# Patient Record
Sex: MALE | Race: WHITE | NOT HISPANIC OR LATINO | URBAN - METROPOLITAN AREA
[De-identification: names, ages, dates, MRNs, and addresses within clinical notes are randomized per-mention and may not be internally consistent; named-entity substitution may affect disease eponyms.]

---
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
## 2020-12-06 ENCOUNTER — EMERGENCY (EMERGENCY)
Facility: HOSPITAL | Age: 20
LOS: 0 days | Discharge: HOME | End: 2020-12-06
Attending: EMERGENCY MEDICINE | Admitting: EMERGENCY MEDICINE
Payer: COMMERCIAL

## 2020-12-06 VITALS
DIASTOLIC BLOOD PRESSURE: 78 MMHG | WEIGHT: 184.97 LBS | HEIGHT: 72 IN | RESPIRATION RATE: 20 BRPM | HEART RATE: 71 BPM | TEMPERATURE: 98 F | OXYGEN SATURATION: 98 % | SYSTOLIC BLOOD PRESSURE: 132 MMHG
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
## 2020-12-06 DIAGNOSIS — M25.561 PAIN IN RIGHT KNEE: ICD-10-CM

## 2020-12-06 DIAGNOSIS — S83.519A SPRAIN OF ANTERIOR CRUCIATE LIGAMENT OF UNSPECIFIED KNEE, INITIAL ENCOUNTER: Chronic | ICD-10-CM
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
## 2020-12-06 DIAGNOSIS — S89.91XA UNSPECIFIED INJURY OF RIGHT LOWER LEG, INITIAL ENCOUNTER: ICD-10-CM

## 2020-12-06 PROCEDURE — 73564 X-RAY EXAM KNEE 4 OR MORE: CPT | Mod: 26,RT

## 2020-12-06 PROCEDURE — 73590 X-RAY EXAM OF LOWER LEG: CPT | Mod: 26,RT

## 2020-12-06 PROCEDURE — 99284 EMERGENCY DEPT VISIT MOD MDM: CPT

## 2020-12-06 RX ORDER — IBUPROFEN 200 MG
0 TABLET ORAL
Qty: 0 | Refills: 0 | DISCHARGE

## 2020-12-06 NOTE — ED PROVIDER NOTE - CLINICAL SUMMARY MEDICAL DECISION MAKING FREE TEXT BOX
x ray reviewed, results d/w patient, Rec JENNIFER, knee immobilizer.  Pt will f/u with his Orthopedic. Pt instructed to return if any worsening symptoms or concerns.  They verbalize understanding.
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
## 2020-12-06 NOTE — ED PROVIDER NOTE - PHYSICAL EXAMINATION
CONST: Well appearing in NAD  EYES: PERRL, EOMI, Sclera and conjunctiva clear.   ENT: No nasal discharge. Oropharynx normal appearing, no erythema or exudates. No abscess or swelling. Uvula midline.   NECK: Non-tender, no meningeal signs. normal ROM. supple   CARD: S1 S2; No jvd  RESP: Equal BS B/L, No wheezes, rhonchi or rales. No distress  GI: Soft, non-tender, non-distended. no cva tenderness. normal BS  MS: Tenderness to posterior R knee. Normal ROM in all extremities. pulses 2 +. no calf tenderness or swelling  SKIN: Warm, dry, no acute rashes. Good turgor  NEURO: A&Ox3, No focal deficits. Strength 5/5 with no sensory deficits. Steady gait.
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
## 2020-12-06 NOTE — ED PROVIDER NOTE - CARE PROVIDER_API CALL
Michele Alaniz  [facility]  [address] [place] [id number]  Phone: [phone]  Fax: [phone]  Follow Up Time:

## 2020-12-06 NOTE — ED PROVIDER NOTE - NSFOLLOWUPINSTRUCTIONS_ED_ALL_ED_FT
Follow up with PMD and Orthopedics in 1-2 days.    RICE for Routine Care of Injuries  The routine care of many injuries includes rest, ice, compression, and elevation (RICE therapy). RICE therapy is often recommended for injuries to soft tissues, such as a muscle strain, ligament injuries, bruises, and overuse injuries. It can also be used for some bony injuries. Using RICE therapy can help to relieve pain, lessen swelling, and enable your body to heal.    Rest  Rest is required to allow your body to heal. This usually involves reducing your normal activities and avoiding use of the injured part of your body. Generally, you can return to your normal activities when you are comfortable and have been given permission by your health care provider.    Ice  Image   Icing your injury helps to keep the swelling down, and it lessens pain. Do not apply ice directly to your skin.    Put ice in a plastic bag.  Place a towel between your skin and the bag.  Leave the ice on for 20 minutes, 2–3 times a day.    Do this for as long as you are directed by your health care provider.    Compression  Compression means putting pressure on the injured area. Compression helps to keep swelling down, gives support, and helps with discomfort. Compression may be done with an elastic bandage. If an elastic bandage has been applied, follow these general tips:    Remove and reapply the bandage every 3–4 hours or as directed by your health care provider.  Make sure the bandage is not wrapped too tightly, because this can cut off circulation. If part of your body beyond the bandage becomes blue, numb, cold, swollen, or more painful, your bandage is most likely too tight. If this occurs, remove your bandage and reapply it more loosely.  See your health care provider if the bandage seems to be making your problems worse rather than better.    Elevation  Elevation means keeping the injured area raised. This helps to lessen swelling and decrease pain. If possible, your injured area should be elevated at or above the level of your heart or the center of your chest.    When should I seek medical care?  If your pain and swelling continue.  If your symptoms are getting worse rather than improving.  These symptoms may indicate that further evaluation or further X-rays are needed. Sometimes, X-rays may not show a small broken bone (fracture) until a number of days later. Make a follow-up appointment with your health care provider.    When should I seek immediate medical care?  If you have sudden severe pain at or below the area of your injury.  If you have redness or increased swelling around your injury.  If you have tingling or numbness at or below the area of your injury that does not improve after you
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
## 2020-12-06 NOTE — ED PROVIDER NOTE - PATIENT PORTAL LINK FT
You can access the FollowMyHealth Patient Portal offered by [facility] by registering at the following website: [email or web address]. By joining [email or web address] FollowMyHealth portal, you will also be able to view your health information using other applications (apps) compatible with our system.

## 2020-12-07 NOTE — ED PROCEDURE NOTE - CPROC ED TIME OUT STATEMENT1
Form has been refaxed to Carrie Fairchild fax#[phone] also faxed to Nikhil (lucy) fax#[phone]   Spoke with dad informed him of above information no further questions   Closing encounter  Simona Collier RT (R)       “Patient's name, , procedure and correct site were confirmed during the [place] Timeout.”

## 2021-05-05 PROBLEM — Z00.00 ENCOUNTER FOR PREVENTIVE HEALTH EXAMINATION: Status: ACTIVE | Noted: 2021-05-05

## 2021-05-11 ENCOUNTER — RESULT CHARGE (OUTPATIENT)
Age: 21
End: 2021-05-11

## 2021-05-11 ENCOUNTER — APPOINTMENT (OUTPATIENT)
Dept: CARDIOLOGY | Facility: CLINIC | Age: 21
End: 2021-05-11
Payer: MEDICAID

## 2021-05-11 VITALS
DIASTOLIC BLOOD PRESSURE: 80 MMHG | HEART RATE: 69 BPM | SYSTOLIC BLOOD PRESSURE: 120 MMHG | HEIGHT: 74 IN | WEIGHT: 186 LBS | BODY MASS INDEX: 23.87 KG/M2 | TEMPERATURE: 97.2 F

## 2021-05-11 PROCEDURE — 99204 OFFICE O/P NEW MOD 45 MIN: CPT

## 2021-05-11 PROCEDURE — 99072 ADDL SUPL MATRL&STAF TM PHE: CPT

## 2021-05-11 PROCEDURE — 93000 ELECTROCARDIOGRAM COMPLETE: CPT

## 2021-05-11 RX ORDER — MINOCYCLINE HYDROCHLORIDE 50 MG/1
50 CAPSULE ORAL
Qty: 30 | Refills: 0 | Status: DISCONTINUED | COMMUNITY
Start: 2021-04-19

## 2021-05-11 RX ORDER — MINOCYCLINE HYDROCHLORIDE 100 MG/1
100 CAPSULE ORAL
Qty: 30 | Refills: 0 | Status: DISCONTINUED | COMMUNITY
Start: 2021-03-17

## 2021-05-28 ENCOUNTER — APPOINTMENT (OUTPATIENT)
Dept: CARDIOLOGY | Facility: CLINIC | Age: 21
End: 2021-05-28
Payer: MEDICAID

## 2021-05-28 PROCEDURE — 93306 TTE W/DOPPLER COMPLETE: CPT

## 2021-06-23 ENCOUNTER — EMERGENCY (EMERGENCY)
Facility: HOSPITAL | Age: 21
LOS: 0 days | Discharge: HOME | End: 2021-06-23
Attending: EMERGENCY MEDICINE | Admitting: EMERGENCY MEDICINE
Payer: MEDICAID

## 2021-06-23 VITALS — WEIGHT: 180.34 LBS

## 2021-06-23 VITALS
HEART RATE: 62 BPM | OXYGEN SATURATION: 99 % | TEMPERATURE: 97 F | DIASTOLIC BLOOD PRESSURE: 58 MMHG | SYSTOLIC BLOOD PRESSURE: 134 MMHG | RESPIRATION RATE: 20 BRPM

## 2021-06-23 DIAGNOSIS — S83.519A SPRAIN OF ANTERIOR CRUCIATE LIGAMENT OF UNSPECIFIED KNEE, INITIAL ENCOUNTER: Chronic | ICD-10-CM
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
## 2021-06-23 DIAGNOSIS — S83.511A SPRAIN OF ANTERIOR CRUCIATE LIGAMENT OF RIGHT KNEE, INITIAL ENCOUNTER: ICD-10-CM

## 2021-06-23 DIAGNOSIS — I47.1 SUPRAVENTRICULAR TACHYCARDIA: ICD-10-CM

## 2021-06-23 DIAGNOSIS — R07.9 CHEST PAIN, UNSPECIFIED: ICD-10-CM
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
## 2021-06-23 PROCEDURE — 99284 EMERGENCY DEPT VISIT MOD MDM: CPT

## 2021-06-23 PROCEDURE — 99291 CRITICAL CARE FIRST HOUR: CPT

## 2021-06-23 PROCEDURE — 93010 ELECTROCARDIOGRAM REPORT: CPT

## 2021-06-23 RX ORDER — MORPHINE SULFATE 50 MG/1
4 CAPSULE, EXTENDED RELEASE ORAL ONCE
Refills: 0 | Status: DISCONTINUED | OUTPATIENT
Start: 2021-06-23 | End: 2021-06-23

## 2021-06-23 NOTE — ED PROVIDER NOTE - CLINICAL SUMMARY MEDICAL DECISION MAKING FREE TEXT BOX
19 y/o M no PMH sent in by cardiologist due to tachycardia x4-5 days. Pt initially had an echo performed that was done to clear him for sports, and was found to have an arrhythmia. Subsequently he was asked to wear a halter monitor for 4 weeks and was found to be tachycardic over the past 4-5 days which is why he presents to the ED. Pt has had no significant changes in the past 4-5 days with no change in activity, SOB, HA, nausea, vomiting or diarrhea. Pt does note CP 3 days ago, 10 seconds, sharp but did self-resolve.  Pt notes no palpitations and states he feels completely normal. Vaccinations are UTD not including Covid-19. On exam:  Gen - NAD, Head - NCAT, TMs - clear b/l, Pharynx - clear, MMM, Heart - RRR, no m/g/r, Chest with no TTP. Lungs - CTAB, no w/c/r, Abdomen - soft, NT, ND, Skin - No rash, Ext- FROM, no edema, erythema, ecchymosis, Neuro - CN 2-12 intact, nl strength and sensation, nl gait. Plan: Initially consulted Dr. Leary who is currently on maternity leave. A nurse at her office notes that Dr. Allison called for the Pt to come to the ED but will most likely not be the Pt’s cardiologist. Dr. Milian is most likely to take the role for Dr. Leary during her leave. Consulted Dr. Allison who advised to connect with our in house cardiologist for EKG and monitoring of halter. 19 y/o M no PMH sent in by cardiologist due to tachycardia x4-5 days. Pt initially had an echo performed that was done to clear him for sports, and was found to have an arrhythmia. Subsequently he was asked to wear a halter monitor for 4 weeks and was found to be tachycardic over the past 4-5 days which is why he presents to the ED. Pt has had no significant changes in the past 4-5 days with no change in activity, SOB, HA, nausea, vomiting or diarrhea. Pt does note CP 3 days ago, 10 seconds, sharp but did self-resolve.  Pt notes no palpitations and states he feels completely normal. Vaccinations are UTD not including Covid-19. On exam:  Gen - NAD, Head - NCAT, TMs - clear b/l, Pharynx - clear, MMM, Heart - RRR, no m/g/r, Chest with no TTP. Lungs - CTAB, no w/c/r, Abdomen - soft, NT, ND, Skin -  No rash, Ext- FROM, no edema, erythema, ecchymosis, Neuro - CN 2-12 intact, nl strength and sensation, nl gait. Plan: Initially consulted Dr. Leary who is currently on maternity leave. A nurse at her office notes that Dr. Allison called for the Pt to come to the ED but will most likely not be the Pt’s cardiologist. Dr. Milian is most likely to take the role for Dr. Leary during her leave. Consulted Dr. Allison who advised to connect with our in house cardiologist for EKG and monitoring of halter. Cardio cleared patient for d/c home, EP cleared Holter - no need for medication. Pt to f/u with Dr. Joyce of EP outpatient and his cardiologist outpatient.

## 2021-06-23 NOTE — ED PROVIDER NOTE - PATIENT PORTAL LINK FT
You can access the FollowMyHealth Patient Portal offered by  by registering at the following website: [email or web address]. By joining [email or web address]’s FollowMyHealth portal, you will also be able to view your health information using other applications (apps) compatible with our system.

## 2021-06-23 NOTE — ED PROVIDER NOTE - CARE PLAN
Principal Discharge DX:	Sinus tachycardia  Secondary Diagnosis:	ACL (anterior cruciate ligament) tear

## 2021-06-23 NOTE — ED PEDIATRIC NURSE NOTE - NSSUHOSCREENINGYN_ED_ALL_ED
Yes - the patient is able to be screened
No. LAMBERTO screening performed.  STOP BANG Legend: 0-2 = LOW Risk; 3-4 = INTERMEDIATE Risk; 5-8 = HIGH Risk

## 2021-06-23 NOTE — ED PROVIDER NOTE - CARE PROVIDER_API CALL
Evelyn Joyce)  Cardiovascular Disease; Internal Medicine  [facility], [address]  [place], [place] [id number]  Phone: [phone]  Fax: [phone]  Follow Up Time: 7-10 Days

## 2021-06-23 NOTE — ED PEDIATRIC NURSE NOTE - CAS DISCH TRANSFER METHOD
Assessment and Plan:   I have discussed with the patient that a sample of skin via a "skin biopsy would be potentially helpful to further make a specific diagnosis under the microscope   Based on a thorough discussion of this condition and the management approach to it (including a comprehensive discussion of the known risks, side effects and potential benefits of treatment), the patient (family) agrees to implement the following specific plan:    o Procedure:  Skin Biopsy  After a thorough discussion of treatment options and risk/benefits/alternatives (including but not limited to local pain, scarring, dyspigmentation, blistering, possible superinfection, and inability to confirm a diagnosis via histopathology), verbal and written consent were obtained and portion of the rash was biopsied for tissue sample  See below for consent that was obtained from patient and subsequent Procedure Note  INFORMED CONSENT DISCUSSION AND POST-OPERATIVE INSTRUCTIONS FOR PATIENT    I   RATIONALE FOR PROCEDURE  I understand that a skin biopsy allows the Dermatologist to test a lesion or rash under the microscope to obtain a diagnosis  It usually involves numbing the area with numbing medication and removing a small piece of skin; sometimes the area will be closed with sutures  In this specific procedure, sutures are not usually needed  If any sutures are placed, then they are usually need to be removed in 2 weeks or less  I understand that my Dermatologist recommends that a skin "shave" biopsy be performed today  A local anesthetic, similar to the kind that a dentist uses when filling a cavity, will be injected with a very small needle into the skin area to be sampled  The injected skin and tissue underneath "will go to sleep and become numb so no pain should be felt afterwards    An instrument shaped like a tiny "razor blade" (shave biopsy instrument) will be used to cut a small piece of tissue and skin from the area so that a sample of tissue can be taken and examined more closely under the microscope  A slight amount of bleeding will occur, but it will be stopped with direct pressure and a pressure bandage and any other appropriate methods  I understands that a scar will form where the wound was created  Surgical ointment will be applied to help protect the wound  Sutures are not usually needed  II   RISKS AND POTENTIAL COMPLICATIONS   I understand the risks and potential complications of a skin biopsy include but are not limited to the following:   Bleeding   Infection   Pain   Scar/keloid   Skin discoloration   Incomplete Removal   Recurrence   Nerve Damage/Numbness/Loss of Function   Allergic Reaction to Anesthesia   Biopsies are diagnostic procedures and based on findings additional treatment or evaluation may be required   Loss or destruction of specimen resulting in no additional findings    My Dermatologist has explained to me the nature of the condition, the nature of the procedure, and the benefits to be reasonably expected compared with alternative approaches  My Dermatologist has discussed the likelihood of major risks or complications of this procedure including the specific risks listed above, such as bleeding, infection, and scarring/keloid  I understand that a scar is expected after this procedure  I understand that my physician cannot predict if the scar will form a "keloid," which extends beyond the borders of the wound that is created  A keloid is a thick, painful, and bumpy scar  A keloid can be difficult to treat, as it does not always respond well to therapy, which includes injecting cortisone directly into the keloid every few weeks  While this usually reduces the pain and size of the scar, it does not eliminate it  I understand that photographs may be taken before and after the procedure    These will be maintained as part of the medical providers confidential records and may not be made available to me  I further authorize the medical provider to use the photographs for teaching purposes or to illustrate scientific papers, books, or lectures if in his/her judgment, medical research, education, or science may benefit from its use  I have had an opportunity to fully inquire about the risks and benefits of this procedure and its alternatives  I have been given ample time and opportunity to ask questions and to seek a second opinion if I wished to do so  I acknowledge that there have specifically been no guarantees as to the cosmetic results from the procedure  I am aware that with any procedure there is always the possibility of an unexpected complication  III  POST-PROCEDURAL CARE (WHAT YOU WILL NEED TO DO "AFTER THE BIOPSY" TO OPTIMIZE HEALING)     Keep the area clean and dry  Try NOT to remove the bandage or get it wet for the first 24 hours   Gently clean the area and apply surgical ointment (such as Vaseline petrolatum ointment, which is available "over the counter" and not a prescription) to the biopsy site for up to 2 weeks straight  This acts to protect the wound from the outside world   Sutures are not usually placed in this procedure  If any sutures were placed, return for suture removal as instructed (generally 1 week for the face, 2 weeks for the body)   Take Acetaminophen (Tylenol) for discomfort, if no contraindications  Ibuprofen or aspirin could make bleeding worse   Call our office immediately for signs of infection: fever, chills, increased redness, warmth, tenderness, discomfort/pain, or pus or foul smell coming from the wound  WHAT TO DO IF THERE IS ANY BLEEDING? If a small amount of bleeding is noticed, place a clean cloth over the area and apply firm pressure for ten minutes  Check the wound after 10 minutes of direct pressure  If bleeding persists, try one more time for an additional 10 minutes of direct pressure on the area  If the bleeding becomes heavier or does not stop after the second attempt, or if you have any other questions about this procedure, then please call your [facility] by calling [phone] (SKIN)  I hereby acknowledge that I have reviewed and verified the site with my Dermatologist and have requested and authorized my Dermatologist to proceed with the procedure  Assessment and Plan:  Based on a thorough discussion of this condition and the management approach to it (including a comprehensive discussion of the known risks, side effects and potential benefits of treatment), the patient (family) agrees to implement the following specific plan:   Apply Triamcinolone ointment  1 to 3 time a day for two weeks avoid eye area  Discussed risk of thinning of skin if used for a long period of time   Probably related to product that you are applying to face     Follow up in 3 months Private car

## 2021-06-23 NOTE — ED PROVIDER NOTE - PHYSICAL EXAMINATION
GENERAL: well-appearing, well nourished, no acute distress, AOx3  HEENT: Nonerthematous pharynx, no tonsillar hypertrophy or exudates.   CVS: RRR, S1, S2, no murmurs, cap refill < 2 seconds  RESP: lungs clear to auscultation B/L, no wheezing, ronchi, or crackles. Good air entry.   ABD: +BS, soft, nontender, nondistended  SKIN: good turgor, no rash, no bruising,

## 2021-06-23 NOTE — ED PEDIATRIC NURSE NOTE - OBJECTIVE STATEMENT
20 year old male sent in by cardiologist because heart monitor read his HR to be in the 160s for the past few days. Pt denies pain and palpitations. Pt states he did not feel his heart beating fast. Pt states he was playing soccer while wearing the monitoring.

## 2021-06-23 NOTE — ED PROVIDER NOTE - NS ED ROS FT
REVIEW OF SYSTEMS:  CONSTITUTIONAL: (-) fever (-) weakness (-) diaphoresis (-) pain  EYES: (-) change in vision (-) photophobia (-) eye pain  ENT: (-) sore throat (-) ear pain  (-) nasal discharge (-) congestion  NECK: (-) pain, (-) stiffness  CARDIOVASCULAR: (+) chest pain (-) palpitations  RESPIRATORY: (-) SOB (-) cough  (-) wheeze (-) WOB  GASTROINTESTINAL: (-) abdominal pain (-) nausea (-) vomiting (-) diarrhea (-) constipation  GENITOURINARY: (-) dysuria (-) hematuria (-) increased frequency (-) increased urgency  Neurological:  (-) focal deficit (-) altered mental status (-) dizziness (-) headache (-) seizure  SKIN: (-) rash (-) itching (-) joint pain (-) MSK pain (-) swelling  GENERAL: (-) recent travel (-) sick contacts (-) decreased PO (-) decreased urine output

## 2021-06-23 NOTE — CONSULT NOTE ADULT - SUBJECTIVE AND OBJECTIVE BOX
Patient is a 20y old  Male who presents to the ER after he was found to have  to 180 on MCOT    HPI:  hx goes back to 1 month prior to presentation when he was sent for TTE as part of screening for HCOM before start playing soccer  his PCP found an irregular heart beat on TTE exam and he referred him to cardiology for further investigation   he went to dr austin office and he underwent TTE which was negative for any structural heart disease. His EKG showed NSR at that time. MCOT was placed for 30 days on 5/28/21  he was also referred for blood test but he did not do it   he is in the ER to check on his MCOT after he was found to have  to 160   otherwise he denies any palpitations, no chest pain, no dizziness or lightheadedness on exercise, no sob, no LOC, no family hx of SCD, no family hx of ischemic heart disease at early age     PAST MEDICAL & SURGICAL HISTORY:  No pertinent past medical history    ACL (anterior cruciate ligament) tear         ECHO  FINDINGS:  Normal global left ventricular systolic function.   Normal left ventricular internal cavity size.    Normal in size left atrium.  Normal right atrium.   Mild tricuspid regurgitation.   Mild pulmonic valve regurgitation.   Trace mitral valve regurgitation.   There is no evidence of pericardial effusion        MEDICATIONS  (STANDING):    MEDICATIONS  (PRN):      FAMILY HISTORY:  no SCD   no family hx of Congenital heart disease  no ischemic heart disease at early age       REVIEW OF SYSTEMS    General: NAD, healthy active patient     Respiratory and Thorax: no sob, no orthopnea   	  Cardiovascular:	no chest pain, no palpitations    Gastrointestinal:	no abdominal pain     Genitourinary:	no flank pain     Allergic/Immunologic:	  SOCIAL HISTORY: denies energy drinks, no pre work out, no illicit drug use, drinks coffee every day     CIGARETTES: non smoker    ALCOHOL: non alcohol consumer    Vital Signs Last 24 Hrs  T(C): 36.2 (23 Jun 2021 12:12), Max: 36.2 (23 Jun 2021 12:12)  T(F): 97.1 (23 Jun 2021 12:12), Max: 97.1 (23 Jun 2021 12:12)  HR: 62 (23 Jun 2021 12:12) (62 - 62)  BP: 134/58 (23 Jun 2021 12:12) (134/58 - 134/58)  BP(mean): --  RR: 20 (23 Jun 2021 12:12) (20 - 20)  SpO2: 99% (23 Jun 2021 12:12) (99% - 99%)    PHYSICAL EXAM:      Constitutional: in NAD,      Neck: no JVD     Respiratory: clear breath sounds bilaterally     Cardiovascular: normal s1s2, no murmurs     Gastrointestinal: soft, non tender, non distended     Genitourinary: no flank pain     Extremities: no RUTH     Vascular: bilateral pedal and radial pulses     Skin: no cyanosis           ECG: NSR + early repolarization     I&O's Detail      LABS:                  I&O's Summary    BNP  RADIOLOGY & ADDITIONAL STUDIES:

## 2021-06-23 NOTE — ED PEDIATRIC TRIAGE NOTE - HEART RATE METHOD
Spoke with mom, offered appointment for June 27. Mom is worried she will still be inpatient. Mom will call back once discharged to schedule.    noninvasive blood pressure monitor

## 2021-06-23 NOTE — CONSULT NOTE ADULT - ASSESSMENT
Patient is a 20y old  Male who presents to the ER after he was found to have  to 180 on his MCOT, after the monitor detect SVT. Dr. walsh referred him to the ER for further investigations. denies any palpitations, no chest pain, no syncope, no sob, no dizziness or lightheadedness     impression:  - MCOT detection of abnormal HR, most likely SVT     Plan :  - MCOT interrogation by EP  - EKG showed NSR and early repolarization   - TTE showed normal EF and no structural abnormalities   - check TSH, CBC, UDS   - treadmill stress test to detect any underlying ischemic heart disease     Attending note will follow  Patient is a 20y old  Male who presents to the ER after he was found to have  to 180 on his MCOT, after the monitor detect SVT. Dr. walsh referred him to the ER for further investigations. denies any palpitations, no chest pain, no syncope, no sob, no dizziness or lightheadedness     impression:  - MCOT detection of SVT     Plan :  - MCOT interrogation by EP  - EKG showed NSR and early repolarization in the ER  - TTE showed normal EF and no structural abnormalities   - check TSH, CBC, UDS   - F/U with EP to discuss with the patient possibility of ablation vs medical therapy   - treadmill stress test to detect any arrhythmias during exercise as OP     Attending note will follow

## 2021-06-23 NOTE — ED PROVIDER NOTE - OBJECTIVE STATEMENT
21yo M with no pmhx sent by Cardiologist due to tachyardia ranging from 140-180s over the past 4-5 days. On Holter monitor until 06/28. Echo done a few weeks ago showed concern for arrythmia and patient has been wearing Holter since then. Denies any N/V, blurry vision, headache, dizziness, LOC, weakness. Plays soccer and works out. Next appt with Cardiologist in Mid-July.  No pmhx, surgery on R knee in 2018 for torn ligaments, Vax UTD. 19yo M with no pmhx sent by Cardiologist due to tachyardia ranging from 140-180s over the past 4-5 days. On Holter monitor until 06/28. Echo done a few weeks ago showed concern for arrythmia and patient has been wearing Holter since then. Endorses chest pain 3 days ago lasting 10 seconds and self-resolving. Denies any N/V, blurry vision, headache, dizziness, LOC, weakness. Plays soccer and works out. Next appt with Cardiologist in Mid-July.  No pmhx, surgery on R knee in 2018 for torn ligaments, Vax UTD.

## 2021-06-27 ENCOUNTER — APPOINTMENT (OUTPATIENT)
Dept: CARDIOLOGY | Facility: CLINIC | Age: 21
End: 2021-06-27
Payer: MEDICAID

## 2021-06-27 PROCEDURE — 93228 REMOTE 30 DAY ECG REV/REPORT: CPT

## 2021-07-14 ENCOUNTER — LABORATORY RESULT (OUTPATIENT)
Age: 21
End: 2021-07-14

## 2021-07-14 ENCOUNTER — APPOINTMENT (OUTPATIENT)
Dept: CARDIOLOGY | Facility: CLINIC | Age: 21
End: 2021-07-14
Payer: MEDICAID

## 2021-07-14 VITALS
HEIGHT: 74 IN | WEIGHT: 186 LBS | SYSTOLIC BLOOD PRESSURE: 119 MMHG | HEART RATE: 59 BPM | DIASTOLIC BLOOD PRESSURE: 68 MMHG | BODY MASS INDEX: 23.87 KG/M2

## 2021-07-14 DIAGNOSIS — Z82.49 FAMILY HISTORY OF ISCHEMIC HEART DISEASE AND OTHER DISEASES OF THE CIRCULATORY SYSTEM: ICD-10-CM

## 2021-07-14 DIAGNOSIS — Z78.9 OTHER SPECIFIED HEALTH STATUS: ICD-10-CM

## 2021-07-14 LAB
ALBUMIN SERPL ELPH-MCNC: 4.9 G/DL
ALP BLD-CCNC: 131 U/L
ALT SERPL-CCNC: 690 U/L
ANION GAP SERPL CALC-SCNC: 14 MMOL/L
AST SERPL-CCNC: 265 U/L
BASOPHILS # BLD AUTO: 0.03 K/UL
BASOPHILS NFR BLD AUTO: 0.6 %
BILIRUB SERPL-MCNC: 0.8 MG/DL
BUN SERPL-MCNC: 13 MG/DL
CALCIUM SERPL-MCNC: 10 MG/DL
CHLORIDE SERPL-SCNC: 99 MMOL/L
CO2 SERPL-SCNC: 27 MMOL/L
CREAT SERPL-MCNC: 1.1 MG/DL
EOSINOPHIL # BLD AUTO: 0.42 K/UL
EOSINOPHIL NFR BLD AUTO: 7.9 %
GLUCOSE SERPL-MCNC: 80 MG/DL
HCT VFR BLD CALC: 48.2 %
HGB BLD-MCNC: 16.6 G/DL
IMM GRANULOCYTES NFR BLD AUTO: 0.4 %
LYMPHOCYTES # BLD AUTO: 1.63 K/UL
LYMPHOCYTES NFR BLD AUTO: 30.5 %
MAN DIFF?: NORMAL
MCHC RBC-ENTMCNC: 30.5 PG
MCHC RBC-ENTMCNC: 34.4 G/DL
MCV RBC AUTO: 88.6 FL
MONOCYTES # BLD AUTO: 0.82 K/UL
MONOCYTES NFR BLD AUTO: 15.3 %
NEUTROPHILS # BLD AUTO: 2.43 K/UL
NEUTROPHILS NFR BLD AUTO: 45.3 %
PLATELET # BLD AUTO: 235 K/UL
POTASSIUM SERPL-SCNC: 4.5 MMOL/L
PROT SERPL-MCNC: 7.9 G/DL
RBC # BLD: 5.44 M/UL
RBC # FLD: 12.3 %
SODIUM SERPL-SCNC: 140 MMOL/L
T4 FREE SERPL-MCNC: 1.6 NG/DL
TSH SERPL-ACNC: 1.62 UIU/ML
WBC # FLD AUTO: 5.35 K/UL

## 2021-07-14 PROCEDURE — 99205 OFFICE O/P NEW HI 60 MIN: CPT

## 2021-07-14 PROCEDURE — 93000 ELECTROCARDIOGRAM COMPLETE: CPT

## 2021-07-14 NOTE — PHYSICAL EXAM
[Well Developed] : well developed [Well Nourished] : well nourished [No Acute Distress] : no acute distress [Normal Conjunctiva] : normal conjunctiva [Normal Venous Pressure] : normal venous pressure [Normal S1, S2] : normal S1, S2 [No Murmur] : no murmur [No Rub] : no rub [Clear Lung Fields] : clear lung fields [Good Air Entry] : good air entry [No Respiratory Distress] : no respiratory distress  [Soft] : abdomen soft [Normal Gait] : normal gait [No Edema] : no edema [No Cyanosis] : no cyanosis [No Clubbing] : no clubbing [No Varicosities] : no varicosities [No Rash] : no rash [Moves all extremities] : moves all extremities [No Focal Deficits] : no focal deficits [Normal Speech] : normal speech [Alert and Oriented] : alert and oriented [Normal memory] : normal memory

## 2021-07-14 NOTE — ASSESSMENT
[FreeTextEntry1] : # Sinus tachycardia\par - MCOT reviewed in detail. No evidence of any arrhythmias. Pt had sinus tachycardia while playing soccer. Avg HR was in the 70s at baseline. \par - Pt with no evidenc eof structural heart disease on 2D echo\par - Rec exercise treadmill stress to eval for exercise induced arrhythmias\par - Check baseline labs including CBC, TSH, CMP\par - Decrease caffeine intake (limit to 1 cup if possible)\par \par # Vasovagal reflex\par - We discussed the implications of this process and I educated him on the importance of hydration. We also talked about increasing salt consumption via chips/nuts. \par - We also discussed maneuvers he can perform whenever he feels dizzy and presyncopal to arpita episodes. \par \par I have also advised the patient to go to the nearest emergency room if he experiences any chest pain, dyspnea, syncope, or has any other compelling symptoms.\par \par Follow up in 6 weeks to review results.

## 2021-07-14 NOTE — CARDIOLOGY SUMMARY
[de-identified] : 7/14/2021 NSR (HR 61 bpm), early repolarization [de-identified] : 5/29-6/27/21 No AF seen, Sinus tach up to 190 bpm (while pt was playing a soccer game)\par All symptoms correlated to sinus rhythm [de-identified] : 5/28/21 No evidence of structural heart disease. Normal global LVSF.

## 2021-07-14 NOTE — HISTORY OF PRESENT ILLNESS
[FreeTextEntry1] : \par Cardiologist: Dr. Leary\par \par 19 yo M with no past medical history here for initial evaluation of HR up to 160s noted on Biotel. Patient is an avid  and was initially seen by cardiology due to "abnormal heart rhythm" noted while getting an echo for sports clearance. He was called on 6/24 and told to go to the ER for HR up to 160s. Pt states while in the hospital, HR on the monitor was in the 60s. They discharged him with outpatient EP follow up. \par \par Patient denies chest pain, palpitations, presyncope or syncope. He does get dizzy/lightheaded with needles. He also occasionally feels dizzy/lightheaded when he overexerts himself. He works out often and will usually work out until he is almost ready to pass out. \par \par According to mom, pt was also drinking 5-6 cups of coffee (Venti sized at [employer]). Since this has all started, he has cut his caffeine down to one cup a few times a week.

## 2021-07-29 ENCOUNTER — APPOINTMENT (OUTPATIENT)
Dept: CARDIOLOGY | Facility: CLINIC | Age: 21
End: 2021-07-29
Payer: MEDICAID

## 2021-07-29 PROCEDURE — XXXXX: CPT

## 2021-08-03 ENCOUNTER — APPOINTMENT (OUTPATIENT)
Dept: CARDIOLOGY | Facility: CLINIC | Age: 21
End: 2021-08-03

## 2021-08-18 ENCOUNTER — APPOINTMENT (OUTPATIENT)
Dept: CARDIOLOGY | Facility: CLINIC | Age: 21
End: 2021-08-18
Payer: MEDICAID

## 2021-08-18 VITALS
DIASTOLIC BLOOD PRESSURE: 73 MMHG | BODY MASS INDEX: 23.74 KG/M2 | HEART RATE: 74 BPM | RESPIRATION RATE: 16 BRPM | WEIGHT: 185 LBS | HEIGHT: 74 IN | OXYGEN SATURATION: 98 % | SYSTOLIC BLOOD PRESSURE: 132 MMHG | TEMPERATURE: 98.2 F

## 2021-08-18 DIAGNOSIS — R00.0 TACHYCARDIA, UNSPECIFIED: ICD-10-CM

## 2021-08-18 DIAGNOSIS — R94.5 ABNORMAL RESULTS OF LIVER FUNCTION STUDIES: ICD-10-CM

## 2021-08-18 DIAGNOSIS — Z87.898 PERSONAL HISTORY OF OTHER SPECIFIED CONDITIONS: ICD-10-CM

## 2021-08-18 PROCEDURE — 99213 OFFICE O/P EST LOW 20 MIN: CPT

## 2021-08-18 PROCEDURE — 93000 ELECTROCARDIOGRAM COMPLETE: CPT

## 2021-08-18 RX ORDER — MINOCYCLINE HYDROCHLORIDE 50 MG/1
50 CAPSULE ORAL
Refills: 0 | Status: DISCONTINUED | COMMUNITY
End: 2021-08-18

## 2021-08-18 NOTE — ASSESSMENT
[FreeTextEntry1] : # Sinus tachycardia\par - MCOT reviewed in detail. No evidence of any arrhythmias. Pt had sinus tachycardia while playing soccer. Avg HR was in the 70s at baseline. \par - Pt with no evidence of structural heart disease on 2D echo\par - No exercise induced arrhythmias with exercise treadmill stress  \par - Labs reviewed. Normal Hg, electrolytes, Cr, TFTs. LFTs elevated. Patient saw dermatologist and minocycline was d/c'ed due to abnormal LFTs\par \par # Vasovagal reflex\par - We discussed the implications of this process and I educated him on the importance of hydration. We also talked about increasing salt consumption via chips/nuts. \par - We also discussed maneuvers he can perform whenever he feels dizzy and presyncopal to arpita episodes. \par \par I have also advised the patient to go to the nearest emergency room if he experiences any chest pain, dyspnea, syncope, or has any other compelling symptoms.\par \par Follow up as needed

## 2021-08-18 NOTE — HISTORY OF PRESENT ILLNESS
[FreeTextEntry1] : \par Cardiologist: Dr. Leary\par \par 19 yo M with no past medical history here for initial evaluation of HR up to 160s noted on Biotel. Patient is an avid  and was initially seen by cardiology due to "abnormal heart rhythm" noted while getting an echo for sports clearance. He was called on 6/24 and told to go to the ER for HR up to 160s. Pt states while in the hospital, HR on the monitor was in the 60s. They discharged him with outpatient EP follow up. \par \par Patient denies chest pain, palpitations, presyncope or syncope. He does get dizzy/lightheaded with needles. He also occasionally feels dizzy/lightheaded when he overexerts himself. He works out often and will usually work out until he is almost ready to pass out. \par \par According to mom, pt was also drinking 5-6 cups of coffee (Venti sized at [employer]). Since this has all started, he has cut his caffeine down to one cup a few times a week. \par \par Patient is here for routine follow up to discuss labs and stress test. He admits to stabbing chest pain whenever he is anxious.

## 2021-08-18 NOTE — PHYSICAL EXAM
[Well Developed] : well developed [Well Nourished] : well nourished [No Acute Distress] : no acute distress [Normal Conjunctiva] : normal conjunctiva [Normal Venous Pressure] : normal venous pressure [Normal S1, S2] : normal S1, S2 [No Murmur] : no murmur [Clear Lung Fields] : clear lung fields [Good Air Entry] : good air entry [No Respiratory Distress] : no respiratory distress  [Soft] : abdomen soft [Normal Gait] : normal gait [No Edema] : no edema [No Rash] : no rash [Moves all extremities] : moves all extremities [No Focal Deficits] : no focal deficits [Normal Speech] : normal speech [Alert and Oriented] : alert and oriented [Normal memory] : normal memory

## 2021-08-18 NOTE — CARDIOLOGY SUMMARY
[de-identified] : 8/18/21 NSR (HR 68 bpm), early repolarization, PVC\par 7/14/2021 NSR (HR 61 bpm), early repolarization [de-identified] : 5/29-6/27/21 No AF seen, Sinus tach up to 190 bpm (while pt was playing a soccer game)\par All symptoms correlated to sinus rhythm [de-identified] : Paramjit, 13:01 min; 17.20 METS, PVCs (isolated), no ischemic changes. [de-identified] : 5/28/21 No evidence of structural heart disease. Normal global LVSF.

## 2022-10-20 NOTE — ED PROVIDER NOTE - PRO INTERPRETER NEED 2
Bilateral Knee Intraarticular injections under fluoroscopic guidance  [facility]      PREOPERATIVE DIAGNOSIS:  Bilateral knee pain    POSTOPERATIVE DIAGNOSIS:  Same as preop diagnosis    PROCEDURE:   Bilateral Knee Intraarticular injections under fluoroscopic guidance      PRE-PROCEDURE DISCUSSION WITH PATIENT:    Risks and complications were discussed with the patient prior to starting the procedure and informed consent was obtained.  We discussed various topics including but not limited to bleeding, infection, injury, nerve injury, paralysis, coma, death, postprocedural painful flare-up, postprocedural site soreness, and a lack of pain relief.        SURGEON:  Henri Harper MD    REASON FOR PROCEDURE:    Chronic knee pain.  Because of this, I felt that fluoroscopic assistance was necessary to confirm that we entered the joint space.      SEDATION:  Patient declined administration of moderate sedation    ANESTHETIC:  NONE  STEROID:  NONE  HYALURONIC PRODUCT:   Monovisk 88mg      DESCRIPTON OF PROCEDURE:    After obtaining informed consent, an I.V. was not started in the preoperative area. The patient taken to the operating room and was placed in the supine position.  All pressure points were well padded.  The appropriate area was prepped with Chloraprep and draped in a sterile fashion.     The above indicated solution was prepared and the area over the entirety of the knee was prepared with a wide surgical prep of chlorhexidine solution.    On the LEFT KNEE, a point on the medial aspect of the joint line was identified to enter into the joint space, and the sterile needle was inserted parallel with a slight posterior angle to enter easily into the joint space.  Aspiration was negative.   Contrast dye was injected and a proper arthrogram was seen.   Then, slowly, the aforementioned solution was injected easily into the joint space.     On the RIGHT KNEE, a point on the medial aspect of the joint line was  identified to enter into the joint space, and the sterile needle was inserted parallel with a slight posterior angle to enter easily into the joint space.  Aspiration was negative.   Contrast dye was injected and a proper arthrogram was seen.   Then, slowly, the aforementioned solution was injected easily into the joint space.       The needle was removed intact from each site and bleeding was minimal. The insertion site was dressed with a Band-Aid.        ESTIMATED BLOOD LOSS:  <5 mL  SPECIMENS:  none      COMPLICATIONS:   No complications were noted.      TOLERANCE & DISCHARGE CONDITION:    The patient tolerated the procedure well.  The patient was transported to the recovery area without difficulties.  The patient was discharged to home under the care of family in stable and satisfactory condition.      PLAN OF CARE:  1. The patient was given our standard instruction sheet.  2. The patient will Return to clinic in 8 wks.  3. The patient will resume all medications as per the medication reconciliation sheet.           English

## 2022-12-05 ENCOUNTER — EMERGENCY (EMERGENCY)
Facility: HOSPITAL | Age: 22
LOS: 0 days | Discharge: HOME | End: 2022-12-06
Attending: EMERGENCY MEDICINE | Admitting: EMERGENCY MEDICINE

## 2022-12-05 VITALS
HEART RATE: 69 BPM | TEMPERATURE: 97 F | DIASTOLIC BLOOD PRESSURE: 61 MMHG | RESPIRATION RATE: 18 BRPM | SYSTOLIC BLOOD PRESSURE: 131 MMHG | WEIGHT: 190.04 LBS | OXYGEN SATURATION: 98 % | HEIGHT: 74 IN

## 2022-12-05 DIAGNOSIS — R10.13 EPIGASTRIC PAIN: ICD-10-CM

## 2022-12-05 DIAGNOSIS — R10.12 LEFT UPPER QUADRANT PAIN: ICD-10-CM

## 2022-12-05 DIAGNOSIS — R11.2 NAUSEA WITH VOMITING, UNSPECIFIED: ICD-10-CM

## 2022-12-05 DIAGNOSIS — R10.10 UPPER ABDOMINAL PAIN, UNSPECIFIED: ICD-10-CM

## 2022-12-05 DIAGNOSIS — S83.519A SPRAIN OF ANTERIOR CRUCIATE LIGAMENT OF UNSPECIFIED KNEE, INITIAL ENCOUNTER: Chronic | ICD-10-CM

## 2022-12-05 LAB
ALBUMIN SERPL ELPH-MCNC: 5.1 G/DL — SIGNIFICANT CHANGE UP (ref 3.5–5.2)
ALP SERPL-CCNC: 73 U/L — SIGNIFICANT CHANGE UP (ref 30–115)
ALT FLD-CCNC: 24 U/L — SIGNIFICANT CHANGE UP (ref 0–41)
ANION GAP SERPL CALC-SCNC: 13 MMOL/L — SIGNIFICANT CHANGE UP (ref 7–14)
AST SERPL-CCNC: 22 U/L — SIGNIFICANT CHANGE UP (ref 0–41)
BASOPHILS # BLD AUTO: 0.05 K/UL — SIGNIFICANT CHANGE UP (ref 0–0.2)
BASOPHILS NFR BLD AUTO: 0.5 % — SIGNIFICANT CHANGE UP (ref 0–1)
BILIRUB DIRECT SERPL-MCNC: <0.2 MG/DL — SIGNIFICANT CHANGE UP (ref 0–0.3)
BILIRUB INDIRECT FLD-MCNC: >0.2 MG/DL — SIGNIFICANT CHANGE UP (ref 0.2–1.2)
BILIRUB SERPL-MCNC: 0.4 MG/DL — SIGNIFICANT CHANGE UP (ref 0.2–1.2)
BUN SERPL-MCNC: 17 MG/DL — SIGNIFICANT CHANGE UP (ref 10–20)
CALCIUM SERPL-MCNC: 9.9 MG/DL — SIGNIFICANT CHANGE UP (ref 8.4–10.5)
CHLORIDE SERPL-SCNC: 103 MMOL/L — SIGNIFICANT CHANGE UP (ref 98–110)
CO2 SERPL-SCNC: 27 MMOL/L — SIGNIFICANT CHANGE UP (ref 17–32)
CREAT SERPL-MCNC: 1.2 MG/DL — SIGNIFICANT CHANGE UP (ref 0.7–1.5)
EGFR: 88 ML/MIN/1.73M2 — SIGNIFICANT CHANGE UP
EOSINOPHIL # BLD AUTO: 0.42 K/UL — SIGNIFICANT CHANGE UP (ref 0–0.7)
EOSINOPHIL NFR BLD AUTO: 4.2 % — SIGNIFICANT CHANGE UP (ref 0–8)
GLUCOSE SERPL-MCNC: 85 MG/DL — SIGNIFICANT CHANGE UP (ref 70–99)
HCT VFR BLD CALC: 48.8 % — SIGNIFICANT CHANGE UP (ref 42–52)
HGB BLD-MCNC: 17 G/DL — SIGNIFICANT CHANGE UP (ref 14–18)
IMM GRANULOCYTES NFR BLD AUTO: 0.3 % — SIGNIFICANT CHANGE UP (ref 0.1–0.3)
LIDOCAIN IGE QN: 19 U/L — SIGNIFICANT CHANGE UP (ref 7–60)
LYMPHOCYTES # BLD AUTO: 2.93 K/UL — SIGNIFICANT CHANGE UP (ref 1.2–3.4)
LYMPHOCYTES # BLD AUTO: 29.4 % — SIGNIFICANT CHANGE UP (ref 20.5–51.1)
MCHC RBC-ENTMCNC: 30.2 PG — SIGNIFICANT CHANGE UP (ref 27–31)
MCHC RBC-ENTMCNC: 34.8 G/DL — SIGNIFICANT CHANGE UP (ref 32–37)
MCV RBC AUTO: 86.7 FL — SIGNIFICANT CHANGE UP (ref 80–94)
MONOCYTES # BLD AUTO: 0.76 K/UL — HIGH (ref 0.1–0.6)
MONOCYTES NFR BLD AUTO: 7.6 % — SIGNIFICANT CHANGE UP (ref 1.7–9.3)
NEUTROPHILS # BLD AUTO: 5.77 K/UL — SIGNIFICANT CHANGE UP (ref 1.4–6.5)
NEUTROPHILS NFR BLD AUTO: 58 % — SIGNIFICANT CHANGE UP (ref 42.2–75.2)
NRBC # BLD: 0 /100 WBCS — SIGNIFICANT CHANGE UP (ref 0–0)
PLATELET # BLD AUTO: 265 K/UL — SIGNIFICANT CHANGE UP (ref 130–400)
POTASSIUM SERPL-MCNC: 4.1 MMOL/L — SIGNIFICANT CHANGE UP (ref 3.5–5)
POTASSIUM SERPL-SCNC: 4.1 MMOL/L — SIGNIFICANT CHANGE UP (ref 3.5–5)
PROT SERPL-MCNC: 7.5 G/DL — SIGNIFICANT CHANGE UP (ref 6–8)
RBC # BLD: 5.63 M/UL — SIGNIFICANT CHANGE UP (ref 4.7–6.1)
RBC # FLD: 11.9 % — SIGNIFICANT CHANGE UP (ref 11.5–14.5)
SODIUM SERPL-SCNC: 143 MMOL/L — SIGNIFICANT CHANGE UP (ref 135–146)
WBC # BLD: 9.96 K/UL — SIGNIFICANT CHANGE UP (ref 4.8–10.8)
WBC # FLD AUTO: 9.96 K/UL — SIGNIFICANT CHANGE UP (ref 4.8–10.8)

## 2022-12-05 PROCEDURE — 71046 X-RAY EXAM CHEST 2 VIEWS: CPT | Mod: 26

## 2022-12-05 PROCEDURE — 99285 EMERGENCY DEPT VISIT HI MDM: CPT

## 2022-12-05 PROCEDURE — 93010 ELECTROCARDIOGRAM REPORT: CPT

## 2022-12-05 PROCEDURE — 74177 CT ABD & PELVIS W/CONTRAST: CPT | Mod: 26,MA

## 2022-12-05 RX ORDER — KETOROLAC TROMETHAMINE 30 MG/ML
15 SYRINGE (ML) INJECTION ONCE
Refills: 0 | Status: DISCONTINUED | OUTPATIENT
Start: 2022-12-05 | End: 2022-12-05

## 2022-12-05 RX ORDER — FAMOTIDINE 10 MG/ML
20 INJECTION INTRAVENOUS ONCE
Refills: 0 | Status: COMPLETED | OUTPATIENT
Start: 2022-12-05 | End: 2022-12-05

## 2022-12-05 RX ORDER — SODIUM CHLORIDE 9 MG/ML
1000 INJECTION INTRAMUSCULAR; INTRAVENOUS; SUBCUTANEOUS ONCE
Refills: 0 | Status: COMPLETED | OUTPATIENT
Start: 2022-12-05 | End: 2022-12-05

## 2022-12-05 RX ADMIN — FAMOTIDINE 20 MILLIGRAM(S): 10 INJECTION INTRAVENOUS at 20:31

## 2022-12-05 RX ADMIN — Medication 15 MILLIGRAM(S): at 20:30

## 2022-12-05 RX ADMIN — SODIUM CHLORIDE 1000 MILLILITER(S): 9 INJECTION INTRAMUSCULAR; INTRAVENOUS; SUBCUTANEOUS at 20:31

## 2022-12-05 NOTE — ED PROVIDER NOTE - CARE PROVIDER_API CALL
Pam Peoples  GASTROENTEROLOGY  [address] [place] [address]  Phone: [phone]  Fax: [phone]  Follow Up Time:

## 2022-12-05 NOTE — ED PROVIDER NOTE - PHYSICAL EXAMINATION
Physical Exam    Vital Signs: I have reviewed the initial vital signs.  Constitutional: well-nourished, appears stated age, no acute distress  Eyes: Conjunctiva pink, Sclera clear, PERRLA, EOMI.  Cardiovascular: S1 and S2, regular rate, regular rhythm, well-perfused extremities, radial pulses equal and 2+  Respiratory: unlabored respiratory effort, clear to auscultation bilaterally no wheezing, rales and rhonchi  Gastrointestinal: soft, epigastric, no pulsatile mass, normal bowl sounds  Musculoskeletal: supple neck, no lower extremity edema, no midline tenderness  Integumentary: warm, dry, no rash  Neurologic: awake, alert, cranial nerves II-XII grossly intact, extremities’ motor and sensory functions grossly intact  Psychiatric: appropriate mood, appropriate affect

## 2022-12-05 NOTE — ED ADULT TRIAGE NOTE - BP NONINVASIVE DIASTOLIC (MM HG)
Returned call to patient.  Relayed that fine to proceed with medication change made by PCP.  BP at her OV with PCP yesterday 94/60.     61

## 2022-12-05 NOTE — ED PROVIDER NOTE - PATIENT PORTAL LINK FT
You can access the FollowMyHealth Patient Portal offered by [facility] by registering at the following website: [email or web address]. By joining [employer]’s FollowMyHealth portal, you will also be able to view your health information using other applications (apps) compatible with our system.

## 2022-12-05 NOTE — ED PROVIDER NOTE - NSFOLLOWUPINSTRUCTIONS_ED_ALL_ED_FT
Follow up with your primary care doctor and GI in 1-2 days    Acute Abdominal Pain    WHAT YOU NEED TO KNOW:    The cause of your abdominal pain may not be found. If a cause is found, treatment will depend on what the cause is.     DISCHARGE INSTRUCTIONS:    Return to the emergency department if:     You vomit blood or cannot stop vomiting.      You have blood in your bowel movement or it looks like tar.       You have bleeding from your rectum.       Your abdomen is larger than usual, more painful, and hard.       You have severe pain in your abdomen.       You stop passing gas and having bowel movements.       You feel weak, dizzy, or faint.    Contact your healthcare provider if:     You have a fever.      You have new signs and symptoms.      Your symptoms do not get better with treatment.       You have questions or concerns about your condition or care.    Medicines may be given to decrease pain, treat an infection, and manage your symptoms. Take your medicine as directed. Call your healthcare provider if you think your medicine is not helping or if you have side effects. Tell him if you are allergic to any medicine. Keep a list of the medicines, vitamins, and herbs you take. Include the amounts, and when and why you take them. Bring the list or the pill bottles to follow-up visits. Carry your medicine list with you in case of an emergency.    Manage your symptoms:     Apply heat on your abdomen for 20 to 30 minutes every 2 hours for as many days as directed. Heat helps decrease pain and muscle spasms.       Manage your stress. Stress may cause abdominal pain. Your healthcare provider may recommend relaxation techniques and deep breathing exercises to help decrease your stress. Your healthcare provider may recommend you talk to someone about your stress or anxiety, such as a counselor or a trusted friend. Get plenty of sleep and exercise regularly.       Limit or do not drink alcohol. Alcohol can make your abdominal pain worse. Ask your healthcare provider if it is safe for you to drink alcohol. Also ask how much is safe for you to drink.       Do not smoke. Nicotine and other chemicals in cigarettes can damage your esophagus and stomach. Ask your healthcare provider for information if you currently smoke and need help to quit. E-cigarettes or smokeless tobacco still contain nicotine. Talk to your healthcare provider before you use these products.     Make changes to the food you eat as directed: Do not eat foods that cause abdominal pain or other symptoms. Eat small meals more often.     Eat more high-fiber foods if you are constipated. High-fiber foods include fruits, vegetables, whole-grain foods, and legumes.       Do not eat foods that cause gas if you have bloating. Examples include broccoli, cabbage, and cauliflower. Do not drink soda or carbonated drinks, because these may also cause gas.       Do not eat foods or drinks that contain sorbitol or fructose if you have diarrhea and bloating. Some examples are fruit juices, candy, jelly, and sugar-free gum.       Do not eat high-fat foods, such as fried foods, cheeseburgers, hot dogs, and desserts.      Limit or do not drink caffeine. Caffeine may make symptoms, such as heart burn or nausea, worse.       Drink plenty of liquids to prevent dehydration from diarrhea or vomiting. Ask your healthcare provider how much liquid to drink each day and which liquids are best for you.     Follow up with your healthcare provider as directed: Write down your questions so you remember to ask them during your visits.       © Copyright [employer] 2019 All illustrations and images included in CareNotes are the copyrighted property of [employer] or [employer]

## 2022-12-05 NOTE — ED PROVIDER NOTE - OBJECTIVE STATEMENT
22 year old female comes to emergency room for upper abd pain with nausea and vomiting x 3 days. patient seen by urgent care center and told come to emergency room to make sure no pancreatitis.

## 2022-12-05 NOTE — ED PROVIDER NOTE - CLINICAL SUMMARY MEDICAL DECISION MAKING FREE TEXT BOX
22yM pmhx svt  no meds pw epigastric luq pain after vomiting x 3  today non bloody - rigo in [facility] - told he might have pancreatitis  and to get a CT scan.   Pt also mentions  episode of dysuria no hematuria  no fever chills.   .abd soft nontender ,  no  jaundice or icterus  labs reviewed [facility] lipase  wnl ,   urine  no uti  or blood  CT no acute pathology  Patient to be discharged from ED in well appearing condition. Any available test results were discussed with and printed  for patient.  Verbal instructions given, including instructions to return to ED immediately for any new, worsening, or concerning symptoms. Limitations of ED work up discussed.  Patient reports understanding of above with capacity and insight. Written discharge instructions additionally given, including follow-up plan.

## 2022-12-06 VITALS
OXYGEN SATURATION: 98 % | RESPIRATION RATE: 18 BRPM | HEART RATE: 72 BPM | SYSTOLIC BLOOD PRESSURE: 128 MMHG | TEMPERATURE: 98 F | DIASTOLIC BLOOD PRESSURE: 62 MMHG

## 2022-12-06 LAB
APPEARANCE UR: CLEAR — SIGNIFICANT CHANGE UP
BILIRUB UR-MCNC: NEGATIVE — SIGNIFICANT CHANGE UP
COLOR SPEC: YELLOW — SIGNIFICANT CHANGE UP
DIFF PNL FLD: NEGATIVE — SIGNIFICANT CHANGE UP
EPI CELLS # UR: ABNORMAL /HPF
GLUCOSE UR QL: NEGATIVE MG/DL — SIGNIFICANT CHANGE UP
KETONES UR-MCNC: NEGATIVE — SIGNIFICANT CHANGE UP
LEUKOCYTE ESTERASE UR-ACNC: NEGATIVE — SIGNIFICANT CHANGE UP
NITRITE UR-MCNC: NEGATIVE — SIGNIFICANT CHANGE UP
PH UR: 6 — SIGNIFICANT CHANGE UP (ref 5–8)
PROT UR-MCNC: ABNORMAL MG/DL
RBC CASTS # UR COMP ASSIST: NEGATIVE — SIGNIFICANT CHANGE UP
SP GR SPEC: 1.01 — SIGNIFICANT CHANGE UP (ref 1.01–1.03)
UROBILINOGEN FLD QL: 0.2 MG/DL — SIGNIFICANT CHANGE UP
WBC UR QL: NEGATIVE — SIGNIFICANT CHANGE UP

## 2022-12-06 NOTE — ED ADULT NURSE NOTE - IS THE PATIENT ABLE TO BE SCREENED?
Spoke with pts wife and let her know that the paper prescriptions are up at the  ready for  .    Yes

## 2023-02-03 NOTE — ASU DISCHARGE PLAN (ADULT/PEDIATRIC). - DISCHARGE DATE
15-May-2018 12:08 Wartpeel Counseling:  I discussed with the patient the risks of Wartpeel including but not limited to erythema, scaling, itching, weeping, crusting, and pain.
